# Patient Record
Sex: FEMALE | Race: WHITE
[De-identification: names, ages, dates, MRNs, and addresses within clinical notes are randomized per-mention and may not be internally consistent; named-entity substitution may affect disease eponyms.]

---

## 2017-02-12 ENCOUNTER — HOSPITAL ENCOUNTER (EMERGENCY)
Dept: HOSPITAL 4 - SED | Age: 42
Discharge: HOME | End: 2017-02-12
Payer: COMMERCIAL

## 2017-02-12 VITALS
SYSTOLIC BLOOD PRESSURE: 111 MMHG | OXYGEN SATURATION: 99 % | HEART RATE: 87 BPM | TEMPERATURE: 97.5 F | DIASTOLIC BLOOD PRESSURE: 69 MMHG | RESPIRATION RATE: 16 BRPM

## 2017-02-12 VITALS — HEIGHT: 65 IN | WEIGHT: 158 LBS | BODY MASS INDEX: 26.33 KG/M2

## 2017-02-12 VITALS
HEART RATE: 85 BPM | DIASTOLIC BLOOD PRESSURE: 75 MMHG | OXYGEN SATURATION: 99 % | SYSTOLIC BLOOD PRESSURE: 115 MMHG | TEMPERATURE: 97.2 F | RESPIRATION RATE: 16 BRPM
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
## 2017-02-12 DIAGNOSIS — S61.011A: Primary | ICD-10-CM
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
## 2017-02-12 PROCEDURE — 99283 EMERGENCY DEPT VISIT LOW MDM: CPT

## 2017-02-12 PROCEDURE — 90471 IMMUNIZATION ADMIN: CPT

## 2017-02-12 PROCEDURE — 12001 RPR S/N/AX/GEN/TRNK 2.5CM/<: CPT

## 2017-02-12 PROCEDURE — 90715 TDAP VACCINE 7 YRS/> IM: CPT

## 2017-02-12 NOTE — NUR
Patient given written and verbal discharge instructions and verbalizes 
understanding.  ER MD discussed with patient the results and treatment 
provided.Patient in stable condition, dressing to right thumb clean dry intact. 
ID arm band removed. Patient educated on pain management and to follow up with 
PMD in 2 days. Pain Scale 0/10.

Opportunity for questions provided and answered.

## 2017-02-12 NOTE — NUR
Dr. Piña finished suturing laceration site. No active bleeding. 5 sutures 
placed. Bacitracin applied. EMT rashad dressed site with non adhesive dressing and 
tube gauze. Capillary refill less than three seconds, no numbness or tingling 
per patient, able to wiggle finger freely.

## 2017-02-12 NOTE — NUR
Patient in stable condition, visitor present. Patient states was opening can of 
food before arrival and accidently cut open right posterior thumb. States thumb 
was bleeding at time of incident. Area wrapped in traige. Moderate amount of 
blood noted to dressing, not saturated at this time. Provided patient will 
gauze to hold additional pressure onto site until treated. Will undo dressing 
to visualize wound with MD to ensure that continuous pressure is held for 
bleeding control. No other complaints/injuries per patient or noted.

## 2017-02-12 NOTE — NUR
Dr. Piña at bedside examining patient-unwrapped dressing to left hand. 1.75cm 
by 0.5 cm laceration noted to left posterior base of thumb. Slight active 
bleeding noted. Pressure gauze applied until Dr. Piña treats site. 

-------------------------------------------------------------------------------

Addendum: 02/12/17 at 1334 by CELINE

-------------------------------------------------------------------------------

 RIGHT HAND 

-------------------------------------------------------------------------------

Addendum: 02/12/17 at 1335 by [email or web address]

-------------------------------------------------------------------------------

LACERATION SITE IS TO RIGHT THUMB

## 2017-02-12 NOTE — NUR
Patient in stable condition. Continuing to hold pressure to left thumb lack 
site. Slight bleeding noted, dressing not saturated. 

-------------------------------------------------------------------------------

Addendum: 02/12/17 at 1333 by CELINE

-------------------------------------------------------------------------------

 RIGHT THUMB

## 2019-03-06 ENCOUNTER — HOSPITAL ENCOUNTER (OUTPATIENT)
Dept: HOSPITAL 4 - SRD | Age: 44
Discharge: HOME | End: 2019-03-06
Payer: COMMERCIAL

## 2019-03-06 DIAGNOSIS — J20.9: Primary | ICD-10-CM

## 2022-12-13 ENCOUNTER — HOSPITAL ENCOUNTER (OUTPATIENT)
Dept: HOSPITAL 4 - SDS | Age: 47
Discharge: HOME | End: 2022-12-13
Attending: OTOLARYNGOLOGY
Payer: COMMERCIAL

## 2022-12-13 VITALS — SYSTOLIC BLOOD PRESSURE: 94 MMHG

## 2022-12-13 VITALS — HEIGHT: 65 IN | BODY MASS INDEX: 29.99 KG/M2 | WEIGHT: 180 LBS

## 2022-12-13 DIAGNOSIS — J32.8: Primary | ICD-10-CM

## 2022-12-13 DIAGNOSIS — J33.0: ICD-10-CM

## 2022-12-13 DIAGNOSIS — Z20.822: ICD-10-CM

## 2022-12-13 LAB — HCG UR QL: NEGATIVE

## 2022-12-13 PROCEDURE — 84703 CHORIONIC GONADOTROPIN ASSAY: CPT

## 2022-12-13 PROCEDURE — 31296 NSL/SINS NDSC SURG FRNT SINS: CPT

## 2022-12-13 PROCEDURE — 87426 SARSCOV CORONAVIRUS AG IA: CPT

## 2022-12-13 PROCEDURE — 88305 TISSUE EXAM BY PATHOLOGIST: CPT

## 2022-12-13 PROCEDURE — U0003 INFECTIOUS AGENT DETECTION BY NUCLEIC ACID (DNA OR RNA); SEVERE ACUTE RESPIRATORY SYNDROME CORONAVIRUS 2 (SARS-COV-2) (CORONAVIRUS DISEASE [COVID-19]), AMPLIFIED PROBE TECHNIQUE, MAKING USE OF HIGH THROUGHPUT TECHNOLOGIES AS DESCRIBED BY CMS-2020-01-R: HCPCS

## 2022-12-13 PROCEDURE — 36415 COLL VENOUS BLD VENIPUNCTURE: CPT

## 2022-12-13 PROCEDURE — 31267 ENDOSCOPY MAXILLARY SINUS: CPT

## 2022-12-13 PROCEDURE — 31257 NSL/SINS NDSC TOT W/SPHENDT: CPT

## 2022-12-13 PROCEDURE — C1726 CATH, BAL DIL, NON-VASCULAR: HCPCS
